# Patient Record
Sex: FEMALE | Race: WHITE | URBAN - METROPOLITAN AREA
[De-identification: names, ages, dates, MRNs, and addresses within clinical notes are randomized per-mention and may not be internally consistent; named-entity substitution may affect disease eponyms.]

---

## 2019-02-13 ENCOUNTER — OFFICE VISIT (OUTPATIENT)
Dept: URBAN - METROPOLITAN AREA CLINIC 23 | Facility: CLINIC | Age: 84
End: 2019-02-13

## 2019-02-13 PROCEDURE — 99213 OFFICE O/P EST LOW 20 MIN: CPT | Performed by: OPHTHALMOLOGY

## 2019-02-13 ASSESSMENT — VISUAL ACUITY
OD: 20/30
OS: 20/30

## 2019-02-13 ASSESSMENT — INTRAOCULAR PRESSURE
OD: 12
OS: 11

## 2019-02-13 NOTE — IMPRESSION/PLAN
Impression: Combined forms of age-related cataract, bilateral: H25.813. Condition: established, worsening slowly. Condition: self limited, minor problem. Plan: Discussed diagnosis in detail with patient. No surgical treatment currently recommended. The patient will monitor vision changes and contact us with any decrease in vision. Recommend frequent use of artificial tears especially while reading.

## 2019-02-13 NOTE — IMPRESSION/PLAN
Impression: Dermatochalasis of right upper eyelid: H02.831. Plan: Discussed diagnosis in detail with patient. Advised patient of condition. Consult recommended [[facility]]. Discussed treatment options with patient.

## 2019-03-25 ENCOUNTER — OFFICE VISIT (OUTPATIENT)
Dept: URBAN - METROPOLITAN AREA CLINIC 23 | Facility: CLINIC | Age: 84
End: 2019-03-25
Payer: MEDICARE

## 2019-03-25 DIAGNOSIS — H02.831 DERMATOCHALASIS OF RIGHT UPPER EYELID: Primary | ICD-10-CM

## 2019-03-25 DIAGNOSIS — H02.834 DERMATOCHALASIS OF LEFT UPPER EYELID: ICD-10-CM

## 2019-03-25 PROCEDURE — 99214 OFFICE O/P EST MOD 30 MIN: CPT | Performed by: OPHTHALMOLOGY

## 2019-03-25 ASSESSMENT — INTRAOCULAR PRESSURE
OS: 14
OD: 18

## 2019-03-25 NOTE — IMPRESSION/PLAN
Impression: Dermatochalasis of right upper eyelid: H02.831. Plan: Discussed diagnosis in detail with patient. Discussed treatment options with patient. HVF reviewed with patient not visually significant. Recommend 1 year lid eval with HVF or patient can opt for cosmetic surgery. Patient will consider cosmetic BULB.  RL2

## 2019-12-27 ENCOUNTER — OFFICE VISIT (OUTPATIENT)
Dept: URBAN - METROPOLITAN AREA CLINIC 23 | Facility: CLINIC | Age: 84
End: 2019-12-27
Payer: MEDICARE

## 2019-12-27 PROCEDURE — 99213 OFFICE O/P EST LOW 20 MIN: CPT | Performed by: OPHTHALMOLOGY

## 2019-12-27 ASSESSMENT — INTRAOCULAR PRESSURE
OS: 16
OD: 16

## 2019-12-27 ASSESSMENT — KERATOMETRY
OD: 44.75
OS: 44.63

## 2019-12-27 ASSESSMENT — VISUAL ACUITY
OD: 20/30
OS: 20/25

## 2020-09-23 ENCOUNTER — OFFICE VISIT (OUTPATIENT)
Dept: URBAN - METROPOLITAN AREA CLINIC 23 | Facility: CLINIC | Age: 85
End: 2020-09-23
Payer: MEDICARE

## 2020-09-23 PROCEDURE — 99214 OFFICE O/P EST MOD 30 MIN: CPT | Performed by: OPHTHALMOLOGY

## 2020-09-23 ASSESSMENT — VISUAL ACUITY
OD: 20/30
OS: 20/30

## 2020-09-23 ASSESSMENT — INTRAOCULAR PRESSURE
OS: 15
OD: 15

## 2020-09-23 ASSESSMENT — KERATOMETRY
OS: 44.88
OD: 43.13

## 2020-09-23 NOTE — IMPRESSION/PLAN
Impression: Combined forms of age-related cataract, bilateral: H25.813. Condition: established, worsening. Symptoms: could improve with surgery. Plan: Cataract accounts for patient's complaints. Reviewed risks, benefits, and procedure. Patient desires surgery, schedule ce/iol OD then OS, RL2, consider toric lens, distance refractive target, patient is clear for surgery in [facility]. Pt aware may need full time bifocal glasses after surgery for best vision if she does standard lens.

## 2020-10-22 ENCOUNTER — PRE-OPERATIVE VISIT (OUTPATIENT)
Dept: URBAN - METROPOLITAN AREA CLINIC 23 | Facility: CLINIC | Age: 85
End: 2020-10-22
Payer: MEDICARE

## 2020-10-22 DIAGNOSIS — H25.13 AGE-RELATED NUCLEAR CATARACT, BILATERAL: ICD-10-CM

## 2020-10-22 DIAGNOSIS — H25.813 COMBINED FORMS OF AGE-RELATED CATARACT, BILATERAL: Primary | ICD-10-CM

## 2020-10-22 PROCEDURE — 76519 ECHO EXAM OF EYE: CPT | Performed by: OPHTHALMOLOGY

## 2020-10-22 ASSESSMENT — PACHYMETRY
OD: 2.61
OS: 23.51
OS: 2.59
OD: 23.49

## 2020-10-28 ENCOUNTER — SURGERY (OUTPATIENT)
Dept: URBAN - METROPOLITAN AREA SURGERY 11 | Facility: SURGERY | Age: 85
End: 2020-10-28
Payer: MEDICARE

## 2020-10-28 PROCEDURE — 66984 XCAPSL CTRC RMVL W/O ECP: CPT | Performed by: OPHTHALMOLOGY

## 2020-10-29 ENCOUNTER — POST-OPERATIVE VISIT (OUTPATIENT)
Dept: URBAN - METROPOLITAN AREA CLINIC 23 | Facility: CLINIC | Age: 85
End: 2020-10-29
Payer: MEDICARE

## 2020-10-29 DIAGNOSIS — Z48.810 ENCOUNTER FOR SURGICAL AFTERCARE FOLLOWING SURGERY ON A SENSE ORGAN: Primary | ICD-10-CM

## 2020-10-29 ASSESSMENT — INTRAOCULAR PRESSURE
OS: 16
OD: 14

## 2020-10-29 NOTE — IMPRESSION/PLAN
Impression: S/P CE/Toric IOL ORA SN6AT4 [2.25 D] Aspheric +20.0 OD - 1 Day. Encounter for surgical aftercare following surgery on a sense organ  Z48.810. Plan: Use drops as directed above and on handout. Return if sudden vision change or increasing pain. --Taper Pred-Moxi-Nepaf QID x 1 wk, TID x 1 wk, BID x 1wk, QD x 1wk, then d/c
--Advised patient to use artificial tears for comfort.

## 2020-11-04 ENCOUNTER — POST-OPERATIVE VISIT (OUTPATIENT)
Dept: URBAN - METROPOLITAN AREA CLINIC 23 | Facility: CLINIC | Age: 85
End: 2020-11-04

## 2020-11-04 PROCEDURE — 99024 POSTOP FOLLOW-UP VISIT: CPT | Performed by: OPTOMETRIST

## 2020-11-04 ASSESSMENT — INTRAOCULAR PRESSURE
OS: 15
OD: 14

## 2020-11-04 ASSESSMENT — VISUAL ACUITY
OS: 20/30-
OD: 20/20

## 2020-11-04 NOTE — IMPRESSION/PLAN
Impression: S/P CE/Toric IOL ORA SN6AT4 [2.25 D] Aspheric +20.0 OD - 7 Days. Encounter for surgical aftercare following surgery on a sense organ  Z48.810. Post operative instructions reviewed - Plan: Use drops as directed above and on handout. Return if sudden vision change or increasing pain.  --Taper Pred-Moxi-Nepaf TID x 1 wk, BID x 1wk, QD x 1wk, then d/c

## 2020-11-11 ENCOUNTER — SURGERY (OUTPATIENT)
Dept: URBAN - METROPOLITAN AREA SURGERY 11 | Facility: SURGERY | Age: 85
End: 2020-11-11
Payer: MEDICARE

## 2020-11-11 PROCEDURE — 66984 XCAPSL CTRC RMVL W/O ECP: CPT | Performed by: OPHTHALMOLOGY

## 2020-11-12 ASSESSMENT — INTRAOCULAR PRESSURE
OS: 13
OD: 14

## 2020-11-12 NOTE — IMPRESSION/PLAN
Impression: S/P CE/Standard IOL OS - 1 Day. Presence of intraocular lens  Z96.1. Excellent post op course   Post operative instructions reviewed - Plan: Use gtts as directed on handout. Taper Pred-moxi-nepaf QID x 1 wk, TID x 1 wk, BID x 1 wk and qd x 1 wk. Wear eye shield while sleeping x 1 week. RTC if sudden decrease in vision or pain.  --Continue Pred-Moxi-Nepaf

## 2020-11-18 ENCOUNTER — POST-OPERATIVE VISIT (OUTPATIENT)
Dept: URBAN - METROPOLITAN AREA CLINIC 23 | Facility: CLINIC | Age: 85
End: 2020-11-18

## 2020-11-18 ASSESSMENT — VISUAL ACUITY
OD: 20/30-
OS: 20/40

## 2020-11-18 ASSESSMENT — INTRAOCULAR PRESSURE
OD: 15
OS: 15

## 2020-11-18 NOTE — IMPRESSION/PLAN
Impression: S/P Cataract Extraction by phacoemulsification with IOL placement OS - 7 Days. Presence of intraocular lens  Z96.1. Plan: Use drops as directed above and on handout. Return if sudden vision change or increasing pain.  --Taper Pred-Moxi-Nepaf QID x 1 wk, TID x 1 wk, BID x 1wk, QD x 1wk, then d/c

## 2021-01-05 ENCOUNTER — POST-OPERATIVE VISIT (OUTPATIENT)
Dept: URBAN - METROPOLITAN AREA CLINIC 23 | Facility: CLINIC | Age: 86
End: 2021-01-05
Payer: OTHER MISCELLANEOUS

## 2021-01-05 DIAGNOSIS — Z96.1 PRESENCE OF INTRAOCULAR LENS: Primary | ICD-10-CM

## 2021-01-05 ASSESSMENT — INTRAOCULAR PRESSURE
OD: 15
OS: 13

## 2021-01-05 NOTE — IMPRESSION/PLAN
Impression: S/P Cataract Extraction by phacoemulsification with IOL placement OS - 55 Days. Presence of intraocular lens  Z96.1. Plan: Use drops as directed above and on handout. Return if sudden vision change or increasing pain.

## 2022-03-09 ENCOUNTER — OFFICE VISIT (OUTPATIENT)
Dept: URBAN - METROPOLITAN AREA CLINIC 23 | Facility: CLINIC | Age: 87
End: 2022-03-09
Payer: OTHER MISCELLANEOUS

## 2022-03-09 DIAGNOSIS — H26.492 OTHER SECONDARY CATARACT, LEFT EYE: Primary | ICD-10-CM

## 2022-03-09 PROCEDURE — 99213 OFFICE O/P EST LOW 20 MIN: CPT | Performed by: OPTOMETRIST

## 2022-03-09 ASSESSMENT — KERATOMETRY
OS: 44.63
OD: 43.25

## 2022-03-09 ASSESSMENT — INTRAOCULAR PRESSURE
OS: 14
OD: 14

## 2022-03-09 NOTE — IMPRESSION/PLAN
Impression: Other secondary cataract, left eye: H26.492 Left. Condition: mild. Plan: Discussed findings. Mild PCO seen, no treatment necessary at this time. Continue to monitor, call with any vision changes.